# Patient Record
Sex: MALE | Race: BLACK OR AFRICAN AMERICAN | URBAN - METROPOLITAN AREA
[De-identification: names, ages, dates, MRNs, and addresses within clinical notes are randomized per-mention and may not be internally consistent; named-entity substitution may affect disease eponyms.]

---

## 2022-01-19 NOTE — PROGRESS NOTES
Evan Ruano [facility]   39 y.o. male   1980    This is patient's first visit with me. Arlene Rai is here to establish care as their new PCP. 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 Has not been on medication in years.  Sexually Transmitted Diseases     Pt would like to be tested. Pt doesn't think that he has had any exposure. HPI:    Office visit encounter:    Hypertension: Patient is here for evaluation of elevated blood pressures. Age at onset of elevated blood pressure:  36.  Cardiac symptoms none. Patient denies none. Cardiovascular risk factors: hypertension, male gender and obesity (BMI >= 30 kg/m2). Use of agents associated with hypertension: none. History of target organ damage: none. Medications tried include metoprolol. No prior history of stress test.  No history of TIA/CVA. Increased urinary frequency: Patient reported this issue has been going on for some time. Denied issues of nocturia greater than twice a night. He did report of a significant caffeine intake and reported of urinating shortly after finishing a cup of coffee. He reported of drinking more than 2 cups a day at times. No prior history of diagnosed diabetes    Other:  -Patient works for [employer] for about a year.  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 He also reported that one of his family members had worked in [employer] [facility] [facility] for a long time and has recently retired.  -Patient requested specifically for STI work-up. He stated he has no prior history of STI and just wanted a \"checkup. \"    PDMP monitoring:  -Revealed no controlled medications written of any kind.    -Last report:   Last PDMP Chilo Sanabria as Reviewed [facility]):  Review User Review Instant Review Result   [address]rena, GERTRUDE 1/19/2022 10:45 AM Reviewed PDMP [1]       Allergies   Allergen Reactions    Pcn [Penicillins]      Per patient's mother       No current outpatient medications on file prior to visit. No current facility-administered medications on file prior to visit. Family History   Problem Relation Age of Onset    Heart Disease Maternal Uncle         MI 1    Cancer Maternal Grandfather     High Blood Pressure Maternal Grandfather     High Blood Pressure Mother        Social History     Tobacco Use    Smoking status: Current Every Day Smoker     Packs/day: 1.00     Years: 12.00     Pack years: 12.00     Start date: 1996    Smokeless tobacco: Never Used   Substance Use Topics    Alcohol use: Yes     Comment: Socially        Lab Results   Component Value Date    WBC 5.5 05/06/2016    HGB 14.8 05/06/2016    HCT 43.1 05/06/2016    MCV 92 05/06/2016     05/06/2016       Chemistry        Component Value Date/Time     05/06/2016 0837    K 4.3 05/06/2016 0837     05/06/2016 0837    CO2 21 05/06/2016 0837    BUN 7 05/06/2016 0837    CREATININE 1.00 05/06/2016 0837        Component Value Date/Time    CALCIUM 9.2 05/06/2016 0837    ALKPHOS 85 04/15/2016 0734    AST 24 04/15/2016 0734    ALT 17 04/15/2016 0734    BILITOT 0.5 04/15/2016 0734          Lab Results   Component Value Date    ALT 17 04/15/2016    AST 24 04/15/2016    ALKPHOS 85 04/15/2016    BILITOT 0.5 04/15/2016     No results found for: LABA1C  No results found for: EAG    Review of Systems   Constitutional: Negative for activity change, appetite change, fatigue, fever and unexpected weight change. HENT: Negative for congestion, rhinorrhea, sinus pressure and trouble swallowing. Respiratory: Negative for cough, chest tightness, shortness of breath and wheezing.     Cardiovascular: Negative for chest pain, palpitations and leg swelling. Gastrointestinal: Negative for abdominal distention, abdominal pain, blood in stool, constipation, diarrhea, nausea and vomiting. Genitourinary: Positive for frequency. Negative for decreased urine volume, difficulty urinating, dysuria, enuresis, genital sores, hematuria, penile discharge, penile pain, penile swelling, scrotal swelling, testicular pain and urgency. Musculoskeletal: Negative for arthralgias and back pain. Skin: Negative for rash. Neurological: Negative for dizziness, weakness, light-headedness, numbness and headaches. Psychiatric/Behavioral: Negative for sleep disturbance. Wt Readings from Last 3 Encounters:   01/20/22 246 lb 9.6 oz (111.9 kg)   05/31/16 261 lb (118.4 kg)   05/13/16 260 lb 12.9 oz (118.3 kg)       BP Readings from Last 3 Encounters:   01/20/22 138/82   05/31/16 130/84   05/13/16 125/79       Pulse Readings from Last 3 Encounters:   01/20/22 66   05/13/16 70   05/05/16 71       /82   Pulse 66   Temp 96.7 °F (35.9 °C)   Ht 6' 1\" (1.854 m)   Wt 246 lb 9.6 oz (111.9 kg)   SpO2 98%   BMI 32.53 kg/m²      Physical Exam  Vitals reviewed. Constitutional:       General: He is awake. He is not in acute distress. Appearance: He is obese. He is not ill-appearing or diaphoretic. HENT:      Head: Normocephalic and atraumatic. No abrasion or masses. Hair is normal.      Right Ear: External ear normal.      Left Ear: External ear normal.      Nose: Nose normal.   Eyes:      General: Lids are normal. Gaze aligned appropriately. No scleral icterus. Right eye: No discharge. Left eye: No discharge. Extraocular Movements: Extraocular movements intact. Conjunctiva/sclera: Conjunctivae normal.   Neck:      Trachea: Phonation normal.   Cardiovascular:      Rate and Rhythm: Normal rate and regular rhythm. Pulmonary:      Effort: Pulmonary effort is normal. No respiratory distress. Breath sounds:  No wheezing, rhonchi or rales. Abdominal:      General: Abdomen is flat. There is no distension. Palpations: Abdomen is soft. Musculoskeletal:         General: No deformity. Normal range of motion. Cervical back: Normal range of motion. No erythema. Right lower leg: No edema. Left lower leg: No edema. Skin:     Coloration: Skin is not cyanotic, jaundiced or pale. Findings: No abrasion, abscess, bruising, ecchymosis, erythema, signs of injury, laceration, lesion, petechiae, rash or wound. Neurological:      General: No focal deficit present. Mental Status: He is alert. Mental status is at baseline. GCS: GCS eye subscore is 4. GCS verbal subscore is 5. GCS motor subscore is 6. Cranial Nerves: No cranial nerve deficit, dysarthria or facial asymmetry. Motor: No weakness, tremor, atrophy or seizure activity. Coordination: Coordination normal.      Gait: Gait is intact. Psychiatric:         Attention and Perception: Attention and perception normal.         Mood and Affect: Mood and affect normal.         Speech: Speech normal.         Behavior: Behavior normal. Behavior is cooperative. Thought Content: Thought content normal.       Assessment/Plan:   Jose L Arroyo was seen today for established new doctor, hypertension and sexually transmitted diseases. Diagnoses and all orders for this visit:    Encounter to establish care with new doctor  Comments:  Discussed about considering doing an annual physical later in the year. Essential hypertension  Comments: Will start on amlodipine. Advised to monitor for leg edema. Low-salt diet. Exercise as much as possible. Orders:  -     amLODIPine (NORVASC) 5 MG tablet; Take 1 tablet by mouth daily    Routine screening for STI (sexually transmitted infection)  -     C.trachomatis N.gonorrhoeae DNA, Urine;  Future  -     POCT Urinalysis no Micro  -     C.trachomatis N.gonorrhoeae DNA, Urine  -     Hepatitis Panel, Acute; Future  -     HIV Screen; Future  -     Herpes Simplex Virus (HSV) I/II Antibodies IgG & IgM; Future  -     Syphilis Antibody Cascading Reflex; Future    Increased urinary frequency  Comments:  Likely related to caffeine consumption. Advised to either cut back or use half caffeinated beverage. Practice Kegel exercises. Class 1 obesity due to excess calories without serious comorbidity with body mass index (BMI) of 32.0 to 32.9 in adult  Comments:  Lifestyle measures discussed. I reviewed the plan of care with the patient. Patient acknowledged understanding and agreed with plan of care overall. Medications Discontinued During This Encounter   Medication Reason    metoprolol (LOPRESSOR) 25 MG tablet LIST CLEANUP        General information on medications:  -When it comes to medications, whether with starting or adding a new medication or increasing the dose of a current medication, the benefits and risks have to always be considered and weighed over, especially if one is taking other medications as well. -There are no medications that have no side effects and that there is always a risk involved with taking a medication.    -If a side effect were to occur with starting a new medication or with increasing the dose of a current medication that either the medication can be totally discontinued altogether or simply decrease the dose of it and if this would be the case a follow-up appointment would be deemed necessary.    -The drug allergy list will then be updated with the corresponding side effect(s) if it's deemed to be a true 'drug allergy'.     -The most common adverse effects of medication(s) were addressed at today's visit.    -Lastly, the coverage status of a medication may vary from insurance to insurance and the only way to verify if the medication is covered is to send an actual prescription in.    -The drug formulary of each insurance changes without any warning or notification to the healthcare provider let alone the pharmacy.  -The cost of medications vary from insurance to insurance and the cost is always subject to change just like the drug formulary. Follow-up: Return in about 3 months (around 4/20/2022) for HTN. .     Patient was informed that if his or her symptoms worsen to follow up with me sooner or go to the nearest ER if the symptoms are very significant and warrant higher level of care. Regarding my note:  -This note was composed (by me only and not with assistance via a scribe) to the best of my knowledge and recollection of the encounter with the patient using one of my own customized note templates utilizing a combination of typing and dictating with the [address] [place] [address] speech recognition software. As a result, the note may possibly have various errors (e.g. spelling, grammar, and non-sensible words/phrases/statements) despite reviewing the note prior to signing it for completion. Tyson Mason M.D.   [address] [place] [address]    Electronically signed by Roberta Dukes on 1/20/2022 at 5:14 PM.

## 2022-01-20 ENCOUNTER — OFFICE VISIT (OUTPATIENT)
Dept: FAMILY MEDICINE CLINIC | Age: 42
End: 2022-01-20
Payer: COMMERCIAL

## 2022-01-20 VITALS
HEIGHT: 73 IN | TEMPERATURE: 96.7 F | OXYGEN SATURATION: 98 % | WEIGHT: 246.6 LBS | HEART RATE: 66 BPM | DIASTOLIC BLOOD PRESSURE: 82 MMHG | SYSTOLIC BLOOD PRESSURE: 138 MMHG | BODY MASS INDEX: 32.68 KG/M2

## 2022-01-20 DIAGNOSIS — R35.0 INCREASED URINARY FREQUENCY: ICD-10-CM

## 2022-01-20 DIAGNOSIS — I10 ESSENTIAL HYPERTENSION: ICD-10-CM

## 2022-01-20 DIAGNOSIS — Z76.89 ENCOUNTER TO ESTABLISH CARE WITH NEW DOCTOR: Primary | ICD-10-CM

## 2022-01-20 DIAGNOSIS — Z11.3 ROUTINE SCREENING FOR STI (SEXUALLY TRANSMITTED INFECTION): ICD-10-CM

## 2022-01-20 DIAGNOSIS — E66.09 CLASS 1 OBESITY DUE TO EXCESS CALORIES WITHOUT SERIOUS COMORBIDITY WITH BODY MASS INDEX (BMI) OF 32.0 TO 32.9 IN ADULT: ICD-10-CM

## 2022-01-20 LAB
BILIRUBIN, POC: NORMAL
BLOOD URINE, POC: NORMAL
CLARITY, POC: YELLOW
COLOR, POC: CLEAR
GLUCOSE URINE, POC: NORMAL
KETONES, POC: NORMAL
LEUKOCYTE EST, POC: NORMAL
NITRITE, POC: NORMAL
PH, POC: 7
PROTEIN, POC: NORMAL
SPECIFIC GRAVITY, POC: 1.02
UROBILINOGEN, POC: 1

## 2022-01-20 PROCEDURE — 99203 OFFICE O/P NEW LOW 30 MIN: CPT | Performed by: FAMILY MEDICINE

## 2022-01-20 PROCEDURE — G8484 FLU IMMUNIZE NO ADMIN: HCPCS | Performed by: FAMILY MEDICINE

## 2022-01-20 PROCEDURE — G8427 DOCREV CUR MEDS BY ELIG CLIN: HCPCS | Performed by: FAMILY MEDICINE

## 2022-01-20 PROCEDURE — 81002 URINALYSIS NONAUTO W/O SCOPE: CPT | Performed by: FAMILY MEDICINE

## 2022-01-20 PROCEDURE — 4004F PT TOBACCO SCREEN RCVD TLK: CPT | Performed by: FAMILY MEDICINE

## 2022-01-20 PROCEDURE — G8417 CALC BMI ABV UP PARAM F/U: HCPCS | Performed by: FAMILY MEDICINE

## 2022-01-20 RX ORDER — AMLODIPINE BESYLATE 5 MG/1
5 TABLET ORAL DAILY
Qty: 30 TABLET | Refills: 1 | Status: SHIPPED | OUTPATIENT
Start: 2022-01-20 | End: 2022-09-26 | Stop reason: DRUGHIGH

## 2022-01-20 ASSESSMENT — ENCOUNTER SYMPTOMS
SINUS PRESSURE: 0
BACK PAIN: 0
VOMITING: 0
BLOOD IN STOOL: 0
DIARRHEA: 0
ABDOMINAL PAIN: 0
COUGH: 0
SHORTNESS OF BREATH: 0
NAUSEA: 0
CHEST TIGHTNESS: 0
WHEEZING: 0
RHINORRHEA: 0
ABDOMINAL DISTENTION: 0
CONSTIPATION: 0
TROUBLE SWALLOWING: 0

## 2022-01-20 ASSESSMENT — PATIENT HEALTH QUESTIONNAIRE - PHQ9
SUM OF ALL RESPONSES TO PHQ9 QUESTIONS 1 & 2: 0
SUM OF ALL RESPONSES TO PHQ QUESTIONS 1-9: 0
SUM OF ALL RESPONSES TO PHQ QUESTIONS 1-9: 0
1. LITTLE INTEREST OR PLEASURE IN DOING THINGS: 0
SUM OF ALL RESPONSES TO PHQ QUESTIONS 1-9: 0
SUM OF ALL RESPONSES TO PHQ QUESTIONS 1-9: 0
2. FEELING DOWN, DEPRESSED OR HOPELESS: 0

## 2022-01-21 DIAGNOSIS — Z11.3 ROUTINE SCREENING FOR STI (SEXUALLY TRANSMITTED INFECTION): ICD-10-CM

## 2022-01-21 LAB
HAV IGM SER IA-ACNC: NORMAL
HEPATITIS B CORE IGM ANTIBODY: NORMAL
HEPATITIS B SURFACE ANTIGEN INTERPRETATION: NORMAL
HEPATITIS C ANTIBODY INTERPRETATION: NORMAL

## 2022-01-22 LAB
C. TRACHOMATIS DNA ,URINE: NEGATIVE
HIV AG/AB: NORMAL
HIV ANTIGEN: NORMAL
HIV-1 ANTIBODY: NORMAL
HIV-2 AB: NORMAL
N. GONORRHOEAE DNA, URINE: NEGATIVE
RPR: NORMAL

## 2022-01-24 LAB
HERPES TYPE 1/2 IGM COMBINED: 0.47 IV
HERPES TYPE I/II IGG COMBINED: >22.4 IV

## 2022-05-18 DIAGNOSIS — I10 ESSENTIAL HYPERTENSION: ICD-10-CM

## 2022-05-18 RX ORDER — AMLODIPINE BESYLATE 5 MG/1
5 TABLET ORAL DAILY
Qty: 30 TABLET | Refills: 1 | OUTPATIENT
Start: 2022-05-18

## 2022-05-24 DIAGNOSIS — I10 ESSENTIAL HYPERTENSION: ICD-10-CM

## 2022-05-25 RX ORDER — AMLODIPINE BESYLATE 5 MG/1
5 TABLET ORAL DAILY
Qty: 30 TABLET | Refills: 1 | OUTPATIENT
Start: 2022-05-25

## 2022-09-21 NOTE — PROGRESS NOTES
Elsa Rivera [facility]   39 y.o. male   1980    HPI:    Patient was last seen by me on 1/20/2022. Reason(s) for visit:   Chief Complaint   Patient presents with    Joint Pain     R knee. Pt reported being very active in sports. Hypertension     HTN:  BP Readings from Last 3 Encounters:   09/26/22 130/82   01/20/22 138/82   05/31/16 130/84     -Patient has not been checking BP readings regularly.   -Patient denied issues with frequent headache, chest pain, shortness of breath, palpitations, malaise, etc.  -Amlodipine - no issues with BLE edema. Joint swelling  -Onset: 2-3 years  -No provoking/inciting event. No hx of associated trauma/injury  -Location: right lateral head of femur  -No pain  -Pt reported that some days with no provoking factors, he has some joint swelling. More walking causes swelling to go down. Allergies   Allergen Reactions    Pcn [Penicillins]      Per patient's mother       No current outpatient medications on file prior to visit. No current facility-administered medications on file prior to visit.         Family History   Problem Relation Age of Onset    High Blood Pressure Mother     Cancer Maternal Grandfather     High Blood Pressure Maternal Grandfather     Heart Disease Maternal Uncle         MI 1       Social History     Tobacco Use    Smoking status: Every Day     Packs/day: 1.00     Years: 12.00     Pack years: 12.00     Types: Cigarettes     Start date: 56    Smokeless tobacco: Never   Substance Use Topics    Alcohol use: Yes     Comment: Socially        Lab Results   Component Value Date    WBC 5.5 05/06/2016    HGB 14.8 05/06/2016    HCT 43.1 05/06/2016    MCV 92 05/06/2016     05/06/2016         Chemistry        Component Value Date/Time     05/06/2016 0837    K 4.3 05/06/2016 0837     05/06/2016 0837    CO2 21 05/06/2016 0837    BUN 7 05/06/2016 0837    CREATININE 1.00 05/06/2016 0837        Component Value Date/Time    CALCIUM 9.2 05/06/2016 0837    ALKPHOS 85 04/15/2016 0734    AST 24 04/15/2016 0734    ALT 17 04/15/2016 0734    BILITOT 0.5 04/15/2016 0734          Lab Results   Component Value Date    ALT 17 04/15/2016    AST 24 04/15/2016    ALKPHOS 85 04/15/2016    BILITOT 0.5 04/15/2016       Lab Results   Component Value Date    CHOL 217 (H) 04/15/2016     Lab Results   Component Value Date    TRIG 273 (H) 04/15/2016     Lab Results   Component Value Date    HDL 32 (L) 04/15/2016     Lab Results   Component Value Date    LDLCALC 130 (H) 04/15/2016     Lab Results   Component Value Date    LABVLDL 55 (H) 04/15/2016     No results found for: CHOLHDLRATIO      Review of Systems   Constitutional:  Negative for activity change, appetite change, fatigue, fever and unexpected weight change. HENT:  Negative for congestion, rhinorrhea, sinus pressure and trouble swallowing. Respiratory:  Negative for cough, chest tightness, shortness of breath and wheezing. Cardiovascular:  Negative for chest pain, palpitations and leg swelling. Gastrointestinal:  Negative for abdominal distention, abdominal pain, blood in stool, constipation, diarrhea, nausea and vomiting. Genitourinary:  Negative for dysuria, frequency and hematuria. Musculoskeletal:  Positive for arthralgias. Negative for back pain. Skin:  Negative for rash. Neurological:  Negative for dizziness, weakness, light-headedness, numbness and headaches. Wt Readings from Last 3 Encounters:   09/26/22 240 lb 9.6 oz (109.1 kg)   01/20/22 246 lb 9.6 oz (111.9 kg)   05/31/16 261 lb (118.4 kg)       BP Readings from Last 3 Encounters:   09/26/22 130/82   01/20/22 138/82   05/31/16 130/84       Pulse Readings from Last 3 Encounters:   09/26/22 70   01/20/22 66   05/13/16 70       /82   Pulse 70   Temp 97.3 °F (36.3 °C)   Wt 240 lb 9.6 oz (109.1 kg)   SpO2 99%   BMI 31.74 kg/m²      Physical Exam  Vitals reviewed. Constitutional:       General: He is awake.  He is not in acute distress. Appearance: He is obese. He is not ill-appearing or diaphoretic. HENT:      Head: Normocephalic and atraumatic. No abrasion or masses. Hair is normal.      Right Ear: External ear normal.      Left Ear: External ear normal.      Nose: Nose normal.   Eyes:      General: Lids are normal. Gaze aligned appropriately. No scleral icterus. Right eye: No discharge. Left eye: No discharge. Extraocular Movements: Extraocular movements intact. Conjunctiva/sclera: Conjunctivae normal.   Neck:      Trachea: Phonation normal.   Cardiovascular:      Rate and Rhythm: Normal rate and regular rhythm. Pulmonary:      Effort: Pulmonary effort is normal. No respiratory distress. Breath sounds: No wheezing, rhonchi or rales. Abdominal:      General: Abdomen is flat. There is no distension. Palpations: Abdomen is soft. Musculoskeletal:         General: No deformity. Normal range of motion. Cervical back: Normal range of motion. No erythema. Right lower leg: No edema. Left lower leg: No edema. Legs:    Skin:     Coloration: Skin is not cyanotic, jaundiced or pale. Findings: No abrasion, abscess, bruising, ecchymosis, erythema, signs of injury, laceration, lesion, petechiae, rash or wound. Neurological:      General: No focal deficit present. Mental Status: He is alert. Mental status is at baseline. GCS: GCS eye subscore is 4. GCS verbal subscore is 5. GCS motor subscore is 6. Cranial Nerves: No cranial nerve deficit, dysarthria or facial asymmetry. Motor: No weakness, tremor, atrophy or seizure activity. Coordination: Coordination normal.      Gait: Gait is intact. Psychiatric:         Attention and Perception: Attention and perception normal.         Mood and Affect: Mood and affect normal.         Speech: Speech normal.         Behavior: Behavior normal. Behavior is cooperative. Thought Content:  Thought content normal.       Assessment/Plan:   Gayle Valles was seen today for joint pain and hypertension. Diagnoses and all orders for this visit:    Essential hypertension  Comments:  BP close to goal of 120/80. Will increase Amlodipine. Adhere to low salt diet and exercise as much as possible. Orders:  -     amLODIPine (NORVASC) 10 MG tablet; Take 1 tablet by mouth daily    Bone cyst of femur  Comments:  Physical exam is more consistent with a cystic nodule. Since he's asymptomatic, recommended observation. No urgent need for x-ray. He was advised to call if he has pain and any notable change in size and appearance. Will then assess and consider ordering x-rays of the area. I reviewed the plan of care with the patient. Patient acknowledged understanding and agreed with plan of care overall. Medications Discontinued During This Encounter   Medication Reason    amLODIPine (NORVASC) 5 MG tablet DOSE ADJUSTMENT        General information on medications:  -When it comes to medications, whether with starting or adding a new medication or increasing the dose of a current medication, the benefits and risks have to always be considered and weighed over, especially if one is taking other medications as well. -There are no medications that have no side effects and that there is always a risk involved with taking a medication.    -If a side effect were to occur with starting a new medication or with increasing the dose of a current medication that either the medication can be totally discontinued altogether or simply decrease the dose of it and if this would be the case a follow-up appointment would be deemed necessary.    -The drug allergy list will then be updated with the corresponding side effect(s) if it's deemed to be a true 'drug allergy'.     -The most common adverse effects of medication(s) were addressed at today's visit.    -Lastly, the coverage status of a medication may vary from insurance to insurance and the only way to verify if the medication is covered is to send an actual prescription in.    -The drug formulary of each insurance changes without any warning or notification to the healthcare provider let alone the pharmacy.  -The cost of medications vary from insurance to insurance and the cost is always subject to change just like the drug formulary. Follow-up: Return in about 1 year (around 9/26/2023), or if symptoms worsen or fail to improve, for annual physical..     Patient was informed that if his or her symptoms worsen to follow up with me sooner or go to the nearest ER if the symptoms are very significant and warrant higher level of care. Regarding my note:  -This note was composed (by me only and not with assistance via a scribe) to the best of my knowledge and recollection of the encounter with the patient using one of my own customized note templates utilizing a combination of typing and dictating with the [address] [place] [address] speech recognition software. As a result, the note may possibly contain various errors (e.g. spelling, grammar, and non-sensible words/phrases/statements) despite reviewing the note prior to signing it for completion. Time spent includes some or all of the following, both face-to-face time and non face-to-face time, but is not limited to:  [x] Preparing to see the patient by reviewing medical records available (notes, labs, imaging, etc.) prior to seeing the patient. [x] Obtaining and/or reviewing the history from the patient. [x] Performing a medically appropriate examination. [x] Ordering of relevant lab work, medications, referrals, or procedures. [x] Discussing patient's medical issues and formulating an assessment and plan. [x] Reviewing plan of care with patient. Answering any questions or concerns.    [x] Documentation within the electronic health record (EHR)  [] Reviewing records of history relevant to patient's issues after seeing the patient. [] Discussion or coordination of care with other health care professionals  [x] Other: length office visit - 15 minutes.  -I spent a significant amount of time discussing various issues as noted above and also with formulating a treatment plan for each specific issue. Patient was given the opportunity to ask me any questions and address any concerns/issues. I also reviewed lab work (if available) as well as prior notes from PCP and/or other specialists if available. Tyson Mak M.D.   [address] [place] [address]    Electronically signed by Alan Canchola MD M.D. on 9/26/2022 at 8:14 AM.

## 2022-09-26 ENCOUNTER — OFFICE VISIT (OUTPATIENT)
Dept: FAMILY MEDICINE CLINIC | Age: 42
End: 2022-09-26
Payer: COMMERCIAL

## 2022-09-26 VITALS
HEART RATE: 70 BPM | BODY MASS INDEX: 31.74 KG/M2 | WEIGHT: 240.6 LBS | DIASTOLIC BLOOD PRESSURE: 82 MMHG | OXYGEN SATURATION: 99 % | SYSTOLIC BLOOD PRESSURE: 130 MMHG | TEMPERATURE: 97.3 F

## 2022-09-26 DIAGNOSIS — M85.659 BONE CYST OF FEMUR: ICD-10-CM

## 2022-09-26 DIAGNOSIS — I10 ESSENTIAL HYPERTENSION: Primary | ICD-10-CM

## 2022-09-26 PROCEDURE — 99213 OFFICE O/P EST LOW 20 MIN: CPT | Performed by: FAMILY MEDICINE

## 2022-09-26 RX ORDER — AMLODIPINE BESYLATE 10 MG/1
10 TABLET ORAL DAILY
Qty: 90 TABLET | Refills: 3 | Status: SHIPPED | OUTPATIENT
Start: 2022-09-26

## 2022-09-26 ASSESSMENT — ENCOUNTER SYMPTOMS
NAUSEA: 0
COUGH: 0
BACK PAIN: 0
ABDOMINAL DISTENTION: 0
VOMITING: 0
CHEST TIGHTNESS: 0
CONSTIPATION: 0
DIARRHEA: 0
TROUBLE SWALLOWING: 0
WHEEZING: 0
SHORTNESS OF BREATH: 0
BLOOD IN STOOL: 0
ABDOMINAL PAIN: 0
SINUS PRESSURE: 0
RHINORRHEA: 0

## 2023-07-21 NOTE — PROGRESS NOTES
Berenice Tran [facility]   43 y.o. male   1980    HPI:    Patient was last seen by me on 9/26/2022. Reason(s) for visit:   Chief Complaint   Patient presents with    Hypertension     -Interval history-    [] No new significant health event(s)/problem(s) occurred since our last office visit. [] No new health issues/concerns discussed during today's office visit. [x] New health issue(s)/concern(s):    Pt reported of a lesion involving part of his left medial inguinal area as well as part of his left shaft and upper part of his left side of his scrotum. He stated that a physician in the past prescribed him a cream, which he couldn't recall the name of the top of his head. Based on the chart review, he was prescribed imiquimod back in April 2016 by his former PCP (Dr. Bibiana Best). He stated that he stated that after using it for some weeks eventually, it would resolve completely. He denied issues with itching, pain, purulent discharge or bleeding from the site, change in color, bulging mass, dysuria, he has not tried anything else besides his cream.  Specifically when he refill this medication. [] Other noteworthy comment(s):     -Chronic health issue(s)-    HTN:  BP Readings from Last 3 Encounters:   07/24/23 130/82   09/26/22 130/82   01/20/22 138/82     -Patient has not been checking BP readings regularly.   -Patient denied issues with frequent headache, chest pain, shortness of breath, palpitations, malaise, etc.      Allergies   Allergen Reactions    Pcn [Penicillins]      Per patient's mother       No current outpatient medications on file prior to visit. No current facility-administered medications on file prior to visit.         Family History   Problem Relation Age of Onset    High Blood Pressure Mother     Cancer Maternal Grandfather     High Blood Pressure Maternal Grandfather     Heart Disease Maternal Uncle         MI 1       Social History     Tobacco Use    Smoking

## 2023-07-22 DIAGNOSIS — I10 ESSENTIAL HYPERTENSION: ICD-10-CM

## 2023-07-24 ENCOUNTER — OFFICE VISIT (OUTPATIENT)
Dept: FAMILY MEDICINE CLINIC | Age: 43
End: 2023-07-24

## 2023-07-24 VITALS
WEIGHT: 246.4 LBS | TEMPERATURE: 96.6 F | OXYGEN SATURATION: 98 % | SYSTOLIC BLOOD PRESSURE: 130 MMHG | DIASTOLIC BLOOD PRESSURE: 82 MMHG | BODY MASS INDEX: 32.51 KG/M2 | HEART RATE: 61 BPM

## 2023-07-24 DIAGNOSIS — E66.09 CLASS 1 OBESITY DUE TO EXCESS CALORIES WITHOUT SERIOUS COMORBIDITY WITH BODY MASS INDEX (BMI) OF 32.0 TO 32.9 IN ADULT: ICD-10-CM

## 2023-07-24 DIAGNOSIS — I10 ESSENTIAL HYPERTENSION: Primary | ICD-10-CM

## 2023-07-24 DIAGNOSIS — A51.31: ICD-10-CM

## 2023-07-24 PROCEDURE — 99213 OFFICE O/P EST LOW 20 MIN: CPT | Performed by: FAMILY MEDICINE

## 2023-07-24 PROCEDURE — 3075F SYST BP GE 130 - 139MM HG: CPT | Performed by: FAMILY MEDICINE

## 2023-07-24 PROCEDURE — 3079F DIAST BP 80-89 MM HG: CPT | Performed by: FAMILY MEDICINE

## 2023-07-24 RX ORDER — AMLODIPINE BESYLATE 10 MG/1
10 TABLET ORAL DAILY
Qty: 90 TABLET | Refills: 3 | OUTPATIENT
Start: 2023-07-24

## 2023-07-24 RX ORDER — IMIQUIMOD 12.5 MG/.25G
CREAM TOPICAL
Qty: 24 EACH | Refills: 5 | Status: SHIPPED | OUTPATIENT
Start: 2023-07-24 | End: 2023-07-31

## 2023-07-24 RX ORDER — AMLODIPINE BESYLATE 10 MG/1
10 TABLET ORAL DAILY
Qty: 90 TABLET | Refills: 3 | Status: SHIPPED | OUTPATIENT
Start: 2023-07-24

## 2023-07-24 RX ORDER — IMIQUIMOD 12.5 MG/.25G
CREAM TOPICAL
Qty: 12 EACH | Refills: 5 | Status: SHIPPED | OUTPATIENT
Start: 2023-07-24 | End: 2023-07-24 | Stop reason: SDUPTHER

## 2023-07-24 SDOH — ECONOMIC STABILITY: INCOME INSECURITY: HOW HARD IS IT FOR YOU TO PAY FOR THE VERY BASICS LIKE FOOD, HOUSING, MEDICAL CARE, AND HEATING?: NOT HARD AT ALL

## 2023-07-24 SDOH — ECONOMIC STABILITY: TRANSPORTATION INSECURITY
IN THE PAST 12 MONTHS, HAS LACK OF TRANSPORTATION KEPT YOU FROM MEETINGS, WORK, OR FROM GETTING THINGS NEEDED FOR DAILY LIVING?: NO

## 2023-07-24 SDOH — ECONOMIC STABILITY: FOOD INSECURITY: WITHIN THE PAST 12 MONTHS, THE FOOD YOU BOUGHT JUST DIDN'T LAST AND YOU DIDN'T HAVE MONEY TO GET MORE.: NEVER TRUE

## 2023-07-24 SDOH — ECONOMIC STABILITY: HOUSING INSECURITY
IN THE LAST 12 MONTHS, WAS THERE A TIME WHEN YOU DID NOT HAVE A STEADY PLACE TO SLEEP OR SLEPT IN A SHELTER (INCLUDING NOW)?: NO

## 2023-07-24 SDOH — ECONOMIC STABILITY: FOOD INSECURITY: WITHIN THE PAST 12 MONTHS, YOU WORRIED THAT YOUR FOOD WOULD RUN OUT BEFORE YOU GOT MONEY TO BUY MORE.: NEVER TRUE

## 2023-07-24 ASSESSMENT — ENCOUNTER SYMPTOMS
SINUS PRESSURE: 0
RHINORRHEA: 0
ABDOMINAL DISTENTION: 0
CONSTIPATION: 0
VOMITING: 0
NAUSEA: 0
ABDOMINAL PAIN: 0
COUGH: 0
WHEEZING: 0
CHEST TIGHTNESS: 0
BLOOD IN STOOL: 0
DIARRHEA: 0
SHORTNESS OF BREATH: 0
BACK PAIN: 0
TROUBLE SWALLOWING: 0

## 2023-07-24 ASSESSMENT — PATIENT HEALTH QUESTIONNAIRE - PHQ9
2. FEELING DOWN, DEPRESSED OR HOPELESS: 0
SUM OF ALL RESPONSES TO PHQ QUESTIONS 1-9: 0
SUM OF ALL RESPONSES TO PHQ9 QUESTIONS 1 & 2: 0
1. LITTLE INTEREST OR PLEASURE IN DOING THINGS: 0
SUM OF ALL RESPONSES TO PHQ QUESTIONS 1-9: 0
1. LITTLE INTEREST OR PLEASURE IN DOING THINGS: NOT AT ALL
2. FEELING DOWN, DEPRESSED OR HOPELESS: NOT AT ALL
SUM OF ALL RESPONSES TO PHQ QUESTIONS 1-9: 0
SUM OF ALL RESPONSES TO PHQ9 QUESTIONS 1 & 2: 0
SUM OF ALL RESPONSES TO PHQ QUESTIONS 1-9: 0